# Patient Record
Sex: MALE | Race: WHITE | NOT HISPANIC OR LATINO | Employment: FULL TIME | URBAN - NONMETROPOLITAN AREA
[De-identification: names, ages, dates, MRNs, and addresses within clinical notes are randomized per-mention and may not be internally consistent; named-entity substitution may affect disease eponyms.]

---

## 2024-01-15 ENCOUNTER — OFFICE VISIT (OUTPATIENT)
Dept: URGENT CARE | Facility: CLINIC | Age: 69
End: 2024-01-15
Payer: MEDICARE

## 2024-01-15 VITALS
RESPIRATION RATE: 16 BRPM | DIASTOLIC BLOOD PRESSURE: 68 MMHG | HEIGHT: 71 IN | WEIGHT: 255 LBS | OXYGEN SATURATION: 95 % | HEART RATE: 69 BPM | BODY MASS INDEX: 35.7 KG/M2 | TEMPERATURE: 98.3 F | SYSTOLIC BLOOD PRESSURE: 138 MMHG

## 2024-01-15 DIAGNOSIS — K12.2 ORAL INFECTION: ICD-10-CM

## 2024-01-15 DIAGNOSIS — R22.0 RIGHT FACIAL SWELLING: ICD-10-CM

## 2024-01-15 PROCEDURE — 3075F SYST BP GE 130 - 139MM HG: CPT | Performed by: NURSE PRACTITIONER

## 2024-01-15 PROCEDURE — 3078F DIAST BP <80 MM HG: CPT | Performed by: NURSE PRACTITIONER

## 2024-01-15 PROCEDURE — 99203 OFFICE O/P NEW LOW 30 MIN: CPT | Performed by: NURSE PRACTITIONER

## 2024-01-15 RX ORDER — DOXYCYCLINE HYCLATE 100 MG
100 TABLET ORAL 2 TIMES DAILY
Qty: 14 TABLET | Refills: 0 | Status: SHIPPED | OUTPATIENT
Start: 2024-01-15 | End: 2024-01-22

## 2024-01-15 ASSESSMENT — FIBROSIS 4 INDEX: FIB4 SCORE: 0.94

## 2024-01-15 NOTE — PROGRESS NOTES
"Subjective:   Eduardo Ac is a 68 y.o. male who presents for Other (Patient feeling L side of face swollen x 1 day )       HPI  Patient is a pleasant 68 y.o. who presents for evaluation of several hour history of right side of face redness and swelling.  Patient noted upon awakening this morning that he had bit the inner side of his cheek.  States his symptoms began after eating breakfast.  Denies any new foods or exposures.  Denies problems cough, wheezing, shortness of breath.    ROS  All other systems are negative except as documented above within HPI.    MEDS:   Current Outpatient Medications:     hydrocodone/acetaminophen (NORCO)  MG Tab, Take 1-2 Tabs by mouth every 6 hours as needed for Severe Pain., Disp: 40 Tab, Rfl: 0    losartan (COZAAR) 50 MG Tab, Take 50 mg by mouth every day., Disp: , Rfl:     metoprolol (LOPRESSOR) 100 MG Tab, Take 100 mg by mouth at bedtime., Disp: , Rfl:   ALLERGIES: No Known Allergies    Patient's PMH, SocHx, SurgHx, FamHx, Drug allergies and medications were reviewed.     Objective:   /68   Pulse 69   Temp 36.8 °C (98.3 °F) (Temporal)   Resp 16   Ht 1.803 m (5' 11\")   Wt 116 kg (255 lb)   SpO2 95%   BMI 35.57 kg/m²     Physical Exam  Vitals and nursing note reviewed.   Constitutional:       General: He is awake.      Appearance: Normal appearance. He is well-developed.   HENT:      Head: Normocephalic and atraumatic.        Comments: +swelling as diagrammed     Right Ear: External ear normal.      Left Ear: External ear normal.      Nose: Nose normal.      Mouth/Throat:      Lips: Pink.      Mouth: Mucous membranes are moist. Injury and oral lesions present.      Pharynx: Oropharynx is clear. Uvula midline.     Eyes:      General: Lids are normal.      Extraocular Movements: Extraocular movements intact.      Conjunctiva/sclera: Conjunctivae normal.   Cardiovascular:      Rate and Rhythm: Normal rate and regular rhythm.   Pulmonary:      Effort: Pulmonary " effort is normal.   Musculoskeletal:         General: Normal range of motion.      Cervical back: Normal range of motion.   Skin:     General: Skin is warm and dry.   Neurological:      Mental Status: He is alert and oriented to person, place, and time.   Psychiatric:         Mood and Affect: Mood normal.         Behavior: Behavior normal. Behavior is cooperative.         Thought Content: Thought content normal.         Judgment: Judgment normal.         Assessment/Plan:   Assessment    1. Oral infection  - doxycycline (VIBRAMYCIN) 100 MG Tab; Take 1 Tablet by mouth 2 times a day for 7 days.  Dispense: 14 Tablet; Refill: 0    2. Right facial swelling  - doxycycline (VIBRAMYCIN) 100 MG Tab; Take 1 Tablet by mouth 2 times a day for 7 days.  Dispense: 14 Tablet; Refill: 0    Vital signs stable at today's acute urgent care visit.  Begin medications as listed. Recommend to set up mouthwash.    Advised the patient to follow-up with the primary care provider if symptoms persist.  Strict red flags discussed and indications to immediately call 911 or present to the ED. All questions were encouraged and answered to the patient's satisfaction and understanding, and they agree to the plan of care.     This is an acute problem with uncertain prognosis, medication management and instructions as well as management options were provided.  I personally reviewed prior external notes and test results pertinent to today and independently reviewed and interpreted all diagnostics, to include POCT testing. Time spent evaluating this patient includes preparing for visit, counseling/education, exam, evaluation, obtaining history, and ordering lab/test/procedures.      Please note that this dictation was created using voice recognition software. I have made a reasonable attempt to correct obvious errors, but I expect that there are errors of grammar and possibly content that I did not discover before finalizing the note.

## 2024-08-21 ENCOUNTER — OFFICE VISIT (OUTPATIENT)
Dept: OPHTHALMOLOGY | Facility: MEDICAL CENTER | Age: 69
End: 2024-08-21
Payer: MEDICARE

## 2024-08-21 DIAGNOSIS — H53.2 DOUBLE VISION: ICD-10-CM

## 2024-08-21 DIAGNOSIS — H02.401 PTOSIS OF RIGHT EYELID: ICD-10-CM

## 2024-08-21 PROCEDURE — 99204 OFFICE O/P NEW MOD 45 MIN: CPT | Mod: 25 | Performed by: STUDENT IN AN ORGANIZED HEALTH CARE EDUCATION/TRAINING PROGRAM

## 2024-08-21 PROCEDURE — 92250 FUNDUS PHOTOGRAPHY W/I&R: CPT | Performed by: STUDENT IN AN ORGANIZED HEALTH CARE EDUCATION/TRAINING PROGRAM

## 2024-08-21 PROCEDURE — 92060 SENSORIMOTOR EXAMINATION: CPT | Performed by: STUDENT IN AN ORGANIZED HEALTH CARE EDUCATION/TRAINING PROGRAM

## 2024-08-21 ASSESSMENT — CONF VISUAL FIELD
OD_SUPERIOR_TEMPORAL_RESTRICTION: 0
OD_NORMAL: 1
OS_INFERIOR_TEMPORAL_RESTRICTION: 0
OS_INFERIOR_NASAL_RESTRICTION: 0
OS_SUPERIOR_TEMPORAL_RESTRICTION: 0
OS_SUPERIOR_NASAL_RESTRICTION: 0
OD_INFERIOR_NASAL_RESTRICTION: 0
OS_NORMAL: 1
OD_SUPERIOR_NASAL_RESTRICTION: 0
OD_INFERIOR_TEMPORAL_RESTRICTION: 0

## 2024-08-21 ASSESSMENT — VISUAL ACUITY
OS_SC: 20/20
METHOD: SNELLEN - LINEAR
OD_SC+: -2
OS_PH_SC+: -1
OD_PH_SC: 20/15
OS_PH_SC: 20/15
OD_PH_SC+: -2
OD_SC: 20/20

## 2024-08-21 ASSESSMENT — SLIT LAMP EXAM - LIDS
COMMENTS: NORMAL
COMMENTS: NORMAL

## 2024-08-21 ASSESSMENT — EXTERNAL EXAM - LEFT EYE: OS_EXAM: NORMAL

## 2024-08-21 ASSESSMENT — MARGIN REFLEX DISTANCE
OS_MRD2: 5
OS_MRD1: 3
OD_MRD1: 1
OD_MRD2: 5

## 2024-08-21 ASSESSMENT — EXTERNAL EXAM - RIGHT EYE: OD_EXAM: PARTIAL PTOSIS

## 2024-08-21 NOTE — ASSESSMENT & PLAN NOTE
Ptosis OD , +Cogan twitch   Reports onset several weeks ago  Fluctuates worsens towards evening     -MG panel

## 2024-08-21 NOTE — ASSESSMENT & PLAN NOTE
68 yo man with PMH of HTN and prior tobacco use presenting with acute onset double vision    Sudden onset several weeks ago. Reports binocular vertical diplopia, worse on R gaze. The double vision resolves with closure of either eye. No pain. No improvement of symptoms    Exam 8/21/24: VA 20/30 OD 20/30-2 OS, no APD, 1/9 color plates OU, full confrontational fields. Full EOM. Incomitant LHT worse on R gaze and L head tilt. RNFL 98 OD 85 OS    Plan:   Alignment testing demonstrates an incomitant LHT worse on R gaze and L head tilt. 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 The patient requires minimal prism (1 base up OD) on central gaze only. 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 However given the reported ptosis OD and fluctuations worse when fatigued will request Myasthenia panel . Will additionally order MRI brain and MRA head. Pt to return in 3-4 months, if symptoms resolve ok to cancel appt. 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 Low suspicions for GCA given lack     -RTC in 3-4 months  -labs: MG panel, ESR/CRP/ CBC  -MRI brain wwo, MRA head wo

## 2024-08-21 NOTE — PROGRESS NOTES
Peds/Neuro Ophthalmology:   Jaden Sun M.D.    Date & Time note created:    8/21/2024   11:00 AM     Referring MD / APRN:  Joe Chaney M.D., No att. providers found    Patient ID:  Name:             Eduardo Ac     YOB: 1955  Age:                 69 y.o.  male   MRN:               [id number]    Chief Complaint/Reason for Visit:     No chief complaint on file.    History of Present Illness:    Eduardo Ac is a 69 y.o. man who presents for evaluation of binoocular double vision    Mr Ac reports his double vision began on awakening several weeks ago. He reports the double vision is vertical and constant whenever he looks towards the right. The double vision  resolves with closure of either eye; He does feel it worsens by the end of the day. The double vision can be overlapping and sometimes completely separate.Patient will have mild diplopia on central gaze. He denies any prior history of double vision or amblyopia. Around the same time his double vision began he noticed that his R eyelid began to droop. He feels it is worse by the end of the day    Vascular risk factors: HTN ( BP systolic 120s), hx of tobacco use 20 years ago (2packs a day for 10-15 years)    Mr Ac was seen by [facility] on 8/5/24. VA 20/30 OD PH 20/30-2 OS, no APD, PCIOL, with concerns for a R 4th nerve palsy. Patient is red/green color blind          Review of Systems:  ROS    Past Medical History:   Past Medical History:   Diagnosis Date    Hx of migraines     due to multiple childhood head concussions    Hypertension     on Losartan and metoprolol    Pain     chronic back pain    Snoring     never had sleep study  poss sleep apnea       Past Surgical History:  Past Surgical History:   Procedure Laterality Date    KNEE ARTHROSCOPY Left 9/14/2016    Procedure: KNEE ARTHROSCOPY, Chondroplasty and Possible Meniscectomy;  Surgeon: Brent Alejo M.D.;  Location: SURGERY [facility];  Service:      BLOCK EPIDURAL STEROID INJECTION      lumbar    KNEE ARTHROSCOPY Right        Current Outpatient Medications:  Current Outpatient Medications   Medication Sig Dispense Refill    hydrocodone/acetaminophen (NORCO)  MG Tab Take 1-2 Tabs by mouth every 6 hours as needed for Severe Pain. 40 Tab 0    losartan (COZAAR) 50 MG Tab Take 50 mg by mouth every day.      metoprolol (LOPRESSOR) 100 MG Tab Take 100 mg by mouth at bedtime.       No current facility-administered medications for this visit.       Allergies:  No Known Allergies    Family History:  No family history on file.    Social History:  Social History     Socioeconomic History    Marital status:      Spouse name: Not on file    Number of children: Not on file    Years of education: Not on file    Highest education level: Not on file   Occupational History    Not on file   Tobacco Use    Smoking status: Former     Current packs/day: 0.00     Types: Cigarettes     Quit date: 1990     Years since quittin.9    Smokeless tobacco: Not on file   Substance and Sexual Activity    Alcohol use: No    Drug use: No    Sexual activity: Not on file   Other Topics Concern    Not on file   Social History Narrative    Not on file     Social Determinants of Health     Financial Resource Strain: Not on file   Food Insecurity: Not on file   Transportation Needs: Not on file   Physical Activity: Not on file   Stress: Not on file   Social Connections: Not on file   Intimate Partner Violence: Not on file   Housing Stability: Not on file          Physical Exam:  Physical Exam    Oriented x 3  Weight/BMI: There is no height or weight on file to calculate BMI.  There were no vitals taken for this visit.    Base Eye Exam       Visual Acuity (Snellen - Linear)         Right Left    Dist sc 20/20 -2 20/20    Dist ph sc 20/15 -2 20/15 -1              Pupils         Dark Light Shape React APD    Right 3 2 Round Brisk None    Left 3 2 Round Brisk None               Visual Fields         Right Left     Full Full              Extraocular Movement         Right Left     Full Full              Neuro/Psych       Oriented x3: Yes    Mood/Affect: Normal   5/5 orbicularis oculi and oris strength  +cogan twitch                  Strabismus Exam       Method: Alternate cover    Correction: sc      Distance Near Near +3DS N Bifocals                      - - - - - -  X 2 - - - - - -                      LHT 6 - -  - -  X 2 - -  - -  Ortho           flick of LH            - - - - - -  E 8 - - - - - -                   R Tilt L Tilt   Ortho  LHT 5                   Subjectively patient prefers 1 base down OD       Slit Lamp and Fundus Exam       External Exam         Right Left    External partial ptosis Normal    MRD1 1 mm 3 mm    MRD2 5 mm 5 mm              Slit Lamp Exam         Right Left    Lids/Lashes Normal Normal    Conjunctiva/Sclera White and quiet White and quiet    Cornea Clear Clear    Anterior Chamber Deep and quiet Deep and quiet    Iris Round and reactive Round and reactive    Lens Clear Clear              Fundus Exam         Right Left    Disc Normal Normal    Macula Normal Normal                    Pertinent Lab/Test/Imaging Review:  none    Assessment and Plan:     Double vision  70 yo man with PMH of HTN and prior tobacco use presenting with acute onset double vision    Sudden onset several weeks ago. Reports binocular vertical diplopia, worse on R gaze. The double vision resolves with closure of either eye. No pain. No improvement of symptoms    Exam 8/21/24: VA 20/30 OD 20/30-2 OS, no APD, 1/9 color plates OU, full confrontational fields. Full EOM. Incomitant LHT worse on R gaze and L head tilt. RNFL 98 OD 85 OS    Plan:   Alignment testing demonstrates an incomitant LHT worse on R gaze and L head tilt. 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 The patient requires minimal prism (1 base up OD) on central gaze only. Given age and vascular risk factors, likely  microvascular ischemic 4th nerve palsy. However given the reported ptosis OD and fluctuations worse when fatigued will request Myasthenia panel . Will additionally order MRI brain and MRA head. Pt to return in 3-4 months, if symptoms resolve ok to cancel appt. 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 Low suspicions for GCA given lack     -RTC in 3-4 months  -labs: MG panel, ESR/CRP/ CBC  -MRI brain wwo, MRA head wo     Ptosis of right eyelid  Ptosis OD , +Cogan twitch   Reports onset several weeks ago  Fluctuates worsens towards evening     -MG panel         Jaden Sun M.D.

## 2024-09-08 LAB
BASOPHILS # BLD AUTO: 0.1 X10E3/UL (ref 0–0.2)
BASOPHILS NFR BLD AUTO: 1 %
CRP SERPL-MCNC: 3 MG/L (ref 0–10)
EOSINOPHIL # BLD AUTO: 0.3 X10E3/UL (ref 0–0.4)
EOSINOPHIL NFR BLD AUTO: 4 %
ERYTHROCYTE [DISTWIDTH] IN BLOOD BY AUTOMATED COUNT: 12.8 % (ref 11.6–15.4)
ERYTHROCYTE [SEDIMENTATION RATE] IN BLOOD BY WESTERGREN METHOD: 23 MM/HR (ref 0–30)
HCT VFR BLD AUTO: 45.7 % (ref 37.5–51)
HGB BLD-MCNC: 14.9 G/DL (ref 13–17.7)
IMM GRANULOCYTES # BLD AUTO: 0 X10E3/UL (ref 0–0.1)
IMM GRANULOCYTES NFR BLD AUTO: 0 %
IMMATURE CELLS  115398: NORMAL
LYMPHOCYTES # BLD AUTO: 2.8 X10E3/UL (ref 0.7–3.1)
LYMPHOCYTES NFR BLD AUTO: 38 %
MCH RBC QN AUTO: 29.6 PG (ref 26.6–33)
MCHC RBC AUTO-ENTMCNC: 32.6 G/DL (ref 31.5–35.7)
MCV RBC AUTO: 91 FL (ref 79–97)
MONOCYTES # BLD AUTO: 0.8 X10E3/UL (ref 0.1–0.9)
MONOCYTES NFR BLD AUTO: 11 %
MORPHOLOGY BLD-IMP: NORMAL
NEUTROPHILS # BLD AUTO: 3.4 X10E3/UL (ref 1.4–7)
NEUTROPHILS NFR BLD AUTO: 46 %
NRBC BLD AUTO-RTO: NORMAL %
PLATELET # BLD AUTO: 288 X10E3/UL (ref 150–450)
RBC # BLD AUTO: 5.04 X10E6/UL (ref 4.14–5.8)
WBC # BLD AUTO: 7.4 X10E3/UL (ref 3.4–10.8)

## 2024-09-17 ENCOUNTER — TELEPHONE (OUTPATIENT)
Dept: OPHTHALMOLOGY | Facility: MEDICAL CENTER | Age: 69
End: 2024-09-17
Payer: MEDICARE

## 2024-09-17 DIAGNOSIS — H53.2 DOUBLE VISION: ICD-10-CM

## 2024-09-17 RX ORDER — DIAZEPAM 5 MG
5 TABLET ORAL PRN
Qty: 2 TABLET | Refills: 0 | Status: SHIPPED | OUTPATIENT
Start: 2024-09-17 | End: 2024-09-25

## 2024-09-17 NOTE — TELEPHONE ENCOUNTER
Spoke to patient regarding labs. Due to claustrophobia, pt unable to complete MRI. Will reorder MRI brain wwo and send to [facility]. Will prescribe valium prior to imaging. Pt to take 30 minutes prior to imaging and is aware to have a  to and from imaging

## 2024-09-25 DIAGNOSIS — H53.2 DOUBLE VISION: ICD-10-CM

## 2024-09-25 RX ORDER — LORAZEPAM 1 MG/1
1 TABLET ORAL PRN
Qty: 1 TABLET | Refills: 0 | Status: SHIPPED | OUTPATIENT
Start: 2024-09-25 | End: 2025-01-01

## 2024-09-26 ENCOUNTER — TELEPHONE (OUTPATIENT)
Dept: OPHTHALMOLOGY | Facility: MEDICAL CENTER | Age: 69
End: 2024-09-26
Payer: MEDICARE

## 2024-11-06 ENCOUNTER — OFFICE VISIT (OUTPATIENT)
Dept: OPHTHALMOLOGY | Facility: MEDICAL CENTER | Age: 69
End: 2024-11-06
Payer: MEDICARE

## 2024-11-06 DIAGNOSIS — H53.2 DOUBLE VISION: ICD-10-CM

## 2024-11-06 DIAGNOSIS — H02.401 PTOSIS OF RIGHT EYELID: ICD-10-CM

## 2024-11-06 PROCEDURE — 99215 OFFICE O/P EST HI 40 MIN: CPT | Mod: 25 | Performed by: STUDENT IN AN ORGANIZED HEALTH CARE EDUCATION/TRAINING PROGRAM

## 2024-11-06 PROCEDURE — G2212 PROLONG OUTPT/OFFICE VIS: HCPCS | Performed by: STUDENT IN AN ORGANIZED HEALTH CARE EDUCATION/TRAINING PROGRAM

## 2024-11-06 PROCEDURE — 92133 CPTRZD OPH DX IMG PST SGM ON: CPT | Performed by: STUDENT IN AN ORGANIZED HEALTH CARE EDUCATION/TRAINING PROGRAM

## 2024-11-06 PROCEDURE — 92060 SENSORIMOTOR EXAMINATION: CPT | Performed by: STUDENT IN AN ORGANIZED HEALTH CARE EDUCATION/TRAINING PROGRAM

## 2024-11-06 ASSESSMENT — REFRACTION_WEARINGRX
OS_CYLINDER: +0.75
OS_SPHERE: +2.75
OS_AXIS: 135
SPECS_TYPE: SVL
OD_SPHERE: +3.00
OD_AXIS: 032
OD_CYLINDER: +0.25

## 2024-11-06 ASSESSMENT — REFRACTION_MANIFEST
OD_AXIS: 012
OD_CYLINDER: +0.50
OD_SPHERE: +0.00
OS_SPHERE: +0.25
OS_AXIS: 131
METHOD_AUTOREFRACTION: 1
OS_CYLINDER: +1.00

## 2024-11-06 ASSESSMENT — SLIT LAMP EXAM - LIDS
COMMENTS: NORMAL
COMMENTS: NORMAL

## 2024-11-06 ASSESSMENT — CONF VISUAL FIELD
OD_INFERIOR_NASAL_RESTRICTION: 0
OD_INFERIOR_TEMPORAL_RESTRICTION: 0
OD_SUPERIOR_NASAL_RESTRICTION: 0
OS_INFERIOR_NASAL_RESTRICTION: 0
OD_NORMAL: 1
OS_INFERIOR_TEMPORAL_RESTRICTION: 0
OD_SUPERIOR_TEMPORAL_RESTRICTION: 0
OS_SUPERIOR_NASAL_RESTRICTION: 0
OS_SUPERIOR_TEMPORAL_RESTRICTION: 0
OS_NORMAL: 1

## 2024-11-06 ASSESSMENT — VISUAL ACUITY
OD_PH_SC+: -3
OS_SC: 20/30
METHOD: SNELLEN - LINEAR
OD_SC: 20/50
OD_PH_SC: 20/25
OS_PH_SC: 20/20

## 2024-11-06 ASSESSMENT — EXTERNAL EXAM - RIGHT EYE: OD_EXAM: PARTIAL PTOSIS

## 2024-11-06 ASSESSMENT — TONOMETRY
OD_IOP_MMHG: 10
IOP_METHOD: I-CARE
OS_IOP_MMHG: 10

## 2024-11-06 ASSESSMENT — MARGIN REFLEX DISTANCE
OD_MRD2: 4
OS_MRD1: 3
OS_MRD2: 4
OD_MRD1: 1.5

## 2024-11-06 ASSESSMENT — EXTERNAL EXAM - LEFT EYE: OS_EXAM: NORMAL

## 2024-11-06 ASSESSMENT — CUP TO DISC RATIO
OS_RATIO: 0.2
OD_RATIO: 0.2

## 2024-11-06 NOTE — PROGRESS NOTES
Peds/Neuro Ophthalmology:   Jaden Sun M.D.    Date & Time note created:    11/6/2024   4:17 PM     Referring MD / APRN:  Joe Chaney M.D., No att. providers found    Patient ID:  Name:             Eduardo Ac     YOB: 1955  Age:                 69 y.o.  male   MRN:               [id number]    Chief Complaint/Reason for Visit:     Other (2 month f.v. for diplopia )    History of Present Illness:    Eduardo Ac is a 69 y.o. man who presents for follow up evaluation of  double vision. He was last seen 8/21/24    His last exam demonstrated an incomitant LHT worse on R gaze and L head tilt consistent with a L 4th nerve palsy. The patient requires minimal prism (1 base up OD) on central gaze only. 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 However given the reported ptosis OD and fluctuations worse when fatigued recommended a Myasthenia panel . MRI brain and MRA head additionally obtained and negative. MG panel was never completed    Eduardo reports no changes in the double vision since his last appt. He continues to experience intermittent vertical diplopia, most noticeably on R gaze. He states if he looks toward the left or down the double vision is gone. He feels the double vision and R ptosis is worse when tired or towads the end of the day. He reports significant eye fatigue and pain, irritation and grittiness OU, tearing OU. He will occasionally use artificial tears when the pain is severe.     As a recap,  Mr Ac reports his double vision began on awakening several weeks ago. He reports the double vision is vertical and constant whenever he looks towards the right. The double vision  resolves with closure of either eye; He does feel it worsens by the end of the day. The double vision can be overlapping and sometimes completely separate.Patient will have mild diplopia on central gaze. He denies any prior history of double vision or  amblyopia. Around the same time his double vision began he noticed that his R eyelid began to droop. He feels it is worse by the end of the day    Vascular risk factors: HTN ( BP systolic 120s), hx of tobacco use 20 years ago (2packs a day for 10-15 years)    Mr Ac was seen by [facility] on 8/5/24. VA 20/30 OD PH 20/30-2 OS, no APD, PCIOL, with concerns for a R 4th nerve palsy. Patient is red/green color blind          Review of Systems:  ROS    Past Medical History:   Past Medical History:   Diagnosis Date    Hx of migraines     due to multiple childhood head concussions    Hypertension     on Losartan and metoprolol    Pain     chronic back pain    Snoring     never had sleep study  poss sleep apnea       Past Surgical History:  Past Surgical History:   Procedure Laterality Date    KNEE ARTHROSCOPY Left 9/14/2016    Procedure: KNEE ARTHROSCOPY, Chondroplasty and Possible Meniscectomy;  Surgeon: Brent Alejo M.D.;  Location: SURGERY [facility];  Service:     BLOCK EPIDURAL STEROID INJECTION      lumbar    KNEE ARTHROSCOPY Right        Current Outpatient Medications:  Current Outpatient Medications   Medication Sig Dispense Refill    LORazepam (ATIVAN) 1 MG Tab Take 1 Tablet by mouth as needed for Anxiety (prior to mri) for up to 1 dose. 1 Tablet 0    losartan (COZAAR) 50 MG Tab Take 50 mg by mouth every day.      metoprolol (LOPRESSOR) 100 MG Tab Take 100 mg by mouth at bedtime.       No current facility-administered medications for this visit.       Allergies:  No Known Allergies    Family History:  No family history on file.    Social History:  Social History     Socioeconomic History    Marital status:      Spouse name: Not on file    Number of children: Not on file    Years of education: Not on file    Highest education level: Not on file   Occupational History    Not on file   Tobacco Use    Smoking status: Former     Current packs/day: 0.00     Types: Cigarettes     Quit date:  1990     Years since quittin.1    Smokeless tobacco: Not on file   Substance and Sexual Activity    Alcohol use: No    Drug use: No    Sexual activity: Not on file   Other Topics Concern    Not on file   Social History Narrative    Not on file     Social Drivers of Health     Financial Resource Strain: Not on file   Food Insecurity: Not on file   Transportation Needs: Not on file   Physical Activity: Not on file   Stress: Not on file   Social Connections: Not on file   Intimate Partner Violence: Not on file   Housing Stability: Not on file          Physical Exam:  Physical Exam    Oriented x 3  Weight/BMI: There is no height or weight on file to calculate BMI.  There were no vitals taken for this visit.    Base Eye Exam       Visual Acuity (Snellen - Linear)         Right Left    Dist sc 20/50 20/30    Dist ph sc 20/25 -3 20/20              Tonometry (i-care, 1:27 PM)         Right Left    Pressure 10 10              Pupils         Pupils Dark Light Shape React APD    Right PERRL 4 3 Round Brisk None    Left PERRL 4 3 Round Brisk None              Visual Fields         Right Left     Full Full              Extraocular Movement         Right Left     Full Full              Neuro/Psych       Oriented x3: Yes    Mood/Affect: Normal   Binocular diplopia on upgaze after 10 seconds  Neg cogan twitch   5/5 orbicular oris strength, orbicularis oculi strength                  Additional Tests       Stereo       Fly: +    Animals: 3/3    Circles: 5/9                  Strabismus Exam       Method: Alternate cover    Correction: sc      Distance Near Near +3DS N Bifocals                      - - - - - -  LHT 1 - - - - - -                      LHT 4-5 - -  - -  LHT 1 - -  - -  Ortho                      - - - - - -  LHT 2 - - - - - -                   R Tilt L Tilt   LHT 1 LHT 5                   LHT worse with L head tilt and and R gaze.     Additionally endorses R monocular diplopia that resolves with pinhole     <5  degrees of excyclotorsion OS on double villagomez     Improvement of pain OU with artificial tears    Diplopia on central gaze is monocular, resolving with AT and pinhole OD       Slit Lamp and Fundus Exam       External Exam         Right Left    External partial ptosis Normal    Reports improvement of irritation OU with refresh drops    MRD1 1.5 mm 3 mm    MRD2 4 mm 4 mm              Slit Lamp Exam         Right Left    Lids/Lashes Normal Normal    Conjunctiva/Sclera Trace Chemosis, Trace Injection White and quiet    Cornea Clear Clear    Anterior Chamber Deep and quiet Deep and quiet    Iris Round and reactive Round and reactive    Lens Clear Clear              Fundus Exam         Right Left    Disc pink, sharp disc margins, flat pink, sharp disc margins, flat    C/D Ratio 0.2 0.2    Macula Normal Normal                  Refraction       Wearing Rx         Sphere Cylinder Axis    Right +3.00 +0.25 032    Left +2.75 +0.75 135      Age: 1yr    Type: SVL              Manifest Refraction (Auto)         Sphere Cylinder Axis    Right +0.00 +0.50 012    Left +0.25 +1.00 131                    Pertinent Lab/Test/Imaging Review:  MRA head and neck 11/1/24  My review : normal, no LVO or flow limiting stenosis, no PCOMM aneurysm    MRI brain 11/1/24  My read: minimal chronic ischemic changes, greatest in the bifrontal lobes. No evidence of L 4th nerve enhancement or pontine abnormality     Assessment and Plan:     Ptosis of right eyelid  Ptosis OD , +Cogan twitch   Reports onset several weeks ago  Fluctuates worsens towards evening   MRA head neg for PCOM aneurysm     -MG panel pending    Double vision  68 yo man with PMH of HTN and prior tobacco use presenting with acute onset double vision    Sudden onset several weeks ago. Reports binocular vertical diplopia, worse on R gaze. The double vision resolves with closure of either eye. No pain. No improvement of symptoms    Exam 8/21/24: VA 20/30 OD 20/30-2 OS, no APD, 1/9 color  plates OU, full confrontational fields. Full EOM. Incomitant LHT worse on R gaze and L head tilt. RNFL 98 OD 85 OS    Exam 11/6/24: VA 20/25-3 PH OD, 20/20 PH OS, no APD. Full confrontational fields. Small LHT worse on R gaze and L head tilt similar to prior. Excyclotorsion of < 5 degree OS. Monocular diplopia OD  resolves with pinhole. Partial ptosis OS. Grittiness and monocular diplopia OD resolves with AT and PH    Plan:   Alignment testing demonstrates a small, but stable incomitant LHT worse on R gaze and L head tilt. 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 Given worsening on downgaze this appears acquired than congenital. MRI brain and MRA head did not demonstrate a structural cause for the L 4th nerve palsy. ESR/CRP/CBC negative. On central gaze patient denies any binocular double vision, but does have monocular diplopia. Monocular diplopia resolves with pinhole.   Given his fluctuations of diplopia and ptosis, MG panel was ordered but never completed. MG reordered. Monocular diplopia appears consistent with dry eye as patient pain and diplopia improve with AT and pinhole. He endorses a grittiness and increased lacrimation OD    -RTC in 6 months  -labs: MG panel  -Consider mestinon trial if ptosis and diplopia truly worsens by the end of the day  -Artificial tears; follow up with optometry for dry eye management           Jaden Sun M.D.    70 total minutes were spent reviewing imaging, records, examining the patient and documenting.

## 2024-11-07 NOTE — ASSESSMENT & PLAN NOTE
70 yo man with PMH of HTN and prior tobacco use presenting with acute onset double vision    Sudden onset several weeks ago. Reports binocular vertical diplopia, worse on R gaze. The double vision resolves with closure of either eye. No pain. No improvement of symptoms    Exam 8/21/24: VA 20/30 OD 20/30-2 OS, no APD, 1/9 color plates OU, full confrontational fields. Full EOM. Incomitant LHT worse on R gaze and L head tilt. RNFL 98 OD 85 OS    Exam 11/6/24: VA 20/25-3 PH OD, 20/20 PH OS, no APD. Full confrontational fields. Small LHT worse on R gaze and L head tilt similar to prior. Excyclotorsion of < 5 degree OS. Monocular diplopia OD  resolves with pinhole. Partial ptosis OS. Grittiness and monocular diplopia OD resolves with AT and PH    Plan:   Alignment testing demonstrates a small, but stable incomitant LHT worse on R gaze and L head tilt. 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 Given worsening on downgaze this appears acquired than congenital. MRI brain and MRA head did not demonstrate a structural cause for the L 4th nerve palsy. ESR/CRP/CBC negative. On central gaze patient denies any binocular double vision, but does have monocular diplopia. Monocular diplopia resolves with pinhole.   Given his fluctuations of diplopia and ptosis, MG panel was ordered but never completed. MG reordered. Monocular diplopia appears consistent with dry eye as patient pain and diplopia improve with AT and pinhole. He endorses a grittiness and increased lacrimation OD    -RTC in 6 months  -labs: MG panel  -Consider mestinon trial if ptosis and diplopia truly worsens by the end of the day  -Artificial tears; follow up with optometry for dry eye management

## 2024-11-07 NOTE — ASSESSMENT & PLAN NOTE
Ptosis OD , +Cogan twitch   Reports onset several weeks ago  Fluctuates worsens towards evening   MRA head neg for PCOM aneurysm     -MG panel pending

## 2024-11-29 LAB
ACHR BIND AB SER-SCNC: <0.03 NMOL/L (ref 0–0.24)
ACHR BLOCK AB SER-ACNC: 21 % (ref 0–25)
ACHR MOD AB SER QL FC: 0 % (ref 0–45)
MUSK AB SER IA-ACNC: <1 U/ML
REFLEX INFORMATION: NORMAL
STRIA MUS AB TITR SER IF: NEGATIVE {TITER}